# Patient Record
(demographics unavailable — no encounter records)

---

## 2025-02-13 NOTE — DISCUSSION/SUMMARY
[FreeTextEntry1] : on palpation she has bilateral breast tenderness. I do notice a 2cm density at 5 o'clock on the left breast.  mammo rx given  I do believe this is likely Muscoskeletal but referred to Dr. Vazquez for further eval s/s density noted on exam.

## 2025-02-13 NOTE — CHIEF COMPLAINT
[Urgent Visit] : Urgent Visit [FreeTextEntry1] : 62 year old female c/o left breast tenderness. she lost 90 lbs using ozempic and has been active in the gym. she states the pain is intermittent and most noticeable with position changes. hx of fibromyalgia.

## 2025-02-27 NOTE — HISTORY OF PRESENT ILLNESS
[FreeTextEntry1] : Referred by Dr. Mayen GYN. Patient presents left breast pain for 1 month. Patient states she feels a burning sensation; however, the symptoms has subsided 1 week ago. Denies palpable mass, redness on the skin, nipple discharge.  No family history of breast cancer.  Patient lost 98 lbs since May 2024.  Recent mammogram is unremarkable

## 2025-02-27 NOTE — ASSESSMENT
[FreeTextEntry1] : History for left breast pain  No suspicious findings noted clinically or on ultrasound  Patient reassured  Asked to return to office for recurrent pain or palpable mass  A total of 30 minutes was spent in consultation evaluation review

## 2025-07-15 NOTE — PLAN
[FreeTextEntry1] : Patient to follow up in 1 year for annual GYN exam Mammogram due: 2/26 Colonoscopy due: completed 08/2024  Bone density due: 2/27 Pap ordered Hemoccult ordered All questions answered, patient agreeable with plan  I Allyson Cha [facility] am scribing for the presence of Dr. Mayen the following sections HISTORY OF PRESENT ILLNESS, PAST MEDICAL/FAMILY/SOCIAL HISTORY; REVIEW OF SYSTEMS; VITAL SIGNS; PHYSICAL EXAM; DISPOSITION. I personally performed the services described in the documentation, reviewed the documentation recorded by the scribe in my presence and it accurately and completely records my words and actions.

## 2025-07-15 NOTE — HISTORY OF PRESENT ILLNESS
[FreeTextEntry1] : Patient is a 64 yo female here today for annual visit. c/o urine leakage which has improved with weight loss. hx of fibromyalgia, on zepbound for weight loss, lost 128 Ibs. She has a umbilical hernia will have a repair and excess skin removed in early 2026

## 2025-07-15 NOTE — PHYSICAL EXAM
[Appropriately responsive] : appropriately responsive [Alert] : alert [No Acute Distress] : no acute distress [No Lymphadenopathy] : no lymphadenopathy [Soft] : soft [Non-tender] : non-tender [Non-distended] : non-distended [No HSM] : No HSM [No Lesions] : no lesions [No Mass] : no mass [Oriented x3] : oriented x3 [Examination Of The Breasts] : a normal appearance [No Masses] : no breast masses were palpable [Labia Majora] : normal [Labia Minora] : normal [Normal] : normal [Uterine Adnexae] : normal [Normal rectal exam] : was normal [FreeTextEntry2] : Allyson NAVAS-BC [Occult Blood Positive] : was negative for occult blood analysis

## 2025-07-15 NOTE — PLAN
[FreeTextEntry1] : Patient to follow up in 1 year for annual GYN exam Mammogram due: 2/26 Colonoscopy due: completed 08/2024  Bone density due: 2/27 Pap ordered Hemoccult ordered All questions answered, patient agreeable with plan  I Allyson Cha [facility] am scribing for the presence of Dr. Mayen the following sections HISTORY OF PRESENT ILLNESS, PAST MEDICAL/FAMILY/SOCIAL HISTORY; REVIEW OF SYSTEMS; VITAL SIGNS; PHYSICAL EXAM; DISPOSITION. I personally performed the services described in the documentation, reviewed the documentation recorded by the scribe in my presence and it accurately and completely records my words and actions.